# Patient Record
Sex: FEMALE | Race: BLACK OR AFRICAN AMERICAN | NOT HISPANIC OR LATINO | URBAN - METROPOLITAN AREA
[De-identification: names, ages, dates, MRNs, and addresses within clinical notes are randomized per-mention and may not be internally consistent; named-entity substitution may affect disease eponyms.]

---

## 2018-01-10 NOTE — PATIENT DISCUSSION
Glasses Rx given with edu that vision improvement with Rx change limited by cataracts. Pt should consider Cat sx for further VA improvement.

## 2018-01-10 NOTE — PATIENT DISCUSSION
The cataracts are visually significant. Pt edu that while there was some change to glasses Rx, her cataracts are causing the more significant vision issue, which will limit BCVA with new Rx lenses.

## 2018-02-27 NOTE — PATIENT DISCUSSION
"""Start Tobradex Drops left eye three times a day. "" ""Start Keflex 500 mg three times a day. x 7 days"" ""Start Warm compresses left eye frequently.  with Oasis Mask"""

## 2021-06-02 ENCOUNTER — IMPORTED ENCOUNTER (OUTPATIENT)
Dept: URBAN - METROPOLITAN AREA CLINIC 9 | Facility: CLINIC | Age: 56
End: 2021-06-02

## 2021-06-08 ENCOUNTER — IMPORTED ENCOUNTER (OUTPATIENT)
Dept: URBAN - METROPOLITAN AREA CLINIC 9 | Facility: CLINIC | Age: 56
End: 2021-06-08

## 2021-06-10 ENCOUNTER — IMPORTED ENCOUNTER (OUTPATIENT)
Dept: URBAN - METROPOLITAN AREA CLINIC 9 | Facility: CLINIC | Age: 56
End: 2021-06-10

## 2021-06-18 ENCOUNTER — IMPORTED ENCOUNTER (OUTPATIENT)
Dept: URBAN - METROPOLITAN AREA CLINIC 9 | Facility: CLINIC | Age: 56
End: 2021-06-18

## 2021-07-27 ENCOUNTER — IMPORTED ENCOUNTER (OUTPATIENT)
Dept: URBAN - METROPOLITAN AREA CLINIC 9 | Facility: CLINIC | Age: 56
End: 2021-07-27

## 2021-10-16 ASSESSMENT — KERATOMETRY
OS_K1POWER_DIOPTERS: 44.75
OD_K2POWER_DIOPTERS: 45.75
OS_AXISANGLE2_DEGREES: 164
OS_K2POWER_DIOPTERS: 45.25
OD_AXISANGLE2_DEGREES: 46
OD_AXISANGLE_DEGREES: 136
OD_K1POWER_DIOPTERS: 45.25
OS_AXISANGLE_DEGREES: 74

## 2021-10-16 ASSESSMENT — VISUAL ACUITY
OS_CC: 20/40 SN
OS_SC: 20/30 -2 SN
OS_SC: 20/50 SN
OS_SC: 20/60 SN
OD_SC: 20/400 SN
OD_SC: CF 2FT SN
OS_SC: 20/40 - SN
OD_SC: 20/100 SN
OD_SC: CF 2FT SN
OD_PH: 20/200 SN
OD_CC: 20/200 SN
OS_CC: 20/40 SN
OD_SC: 20/400 SN
OD_CC: 20/200 SN
OS_SC: 20/60 SN

## 2021-10-16 ASSESSMENT — TONOMETRY
OS_IOP_MMHG: 18
OS_IOP_MMHG: 14
OD_IOP_MMHG: 17
OS_IOP_MMHG: 14
OD_IOP_MMHG: 15
OS_IOP_MMHG: 10
OD_IOP_MMHG: 14
OS_IOP_MMHG: 12
OD_IOP_MMHG: 11

## 2023-04-25 ENCOUNTER — ESTABLISHED PATIENT (OUTPATIENT)
Dept: URBAN - METROPOLITAN AREA CLINIC 9 | Facility: CLINIC | Age: 58
End: 2023-04-25

## 2023-04-25 DIAGNOSIS — H25.12: ICD-10-CM

## 2023-04-25 DIAGNOSIS — H26.101: ICD-10-CM

## 2023-04-25 PROCEDURE — 99214 OFFICE O/P EST MOD 30 MIN: CPT

## 2023-04-25 PROCEDURE — 92136 OPHTHALMIC BIOMETRY: CPT

## 2023-04-25 ASSESSMENT — VISUAL ACUITY
OD_SC: CF 4FT
OS_SC: 20/60
OD_BCVA: 20/100
OS_BCVA: 20/50

## 2023-04-25 ASSESSMENT — KERATOMETRY
OD_AXISANGLE2_DEGREES: 18
OS_K1POWER_DIOPTERS: 45.25
OS_AXISANGLE_DEGREES: 28
OD_K1POWER_DIOPTERS: 45.25
OD_K2POWER_DIOPTERS: 45.75
OD_AXISANGLE_DEGREES: 108
OS_K2POWER_DIOPTERS: 45.75
OS_AXISANGLE2_DEGREES: 118

## 2023-04-25 ASSESSMENT — TONOMETRY
OD_IOP_MMHG: 16
OS_IOP_MMHG: 17

## 2023-05-25 ENCOUNTER — POST-OP (OUTPATIENT)
Dept: URBAN - METROPOLITAN AREA CLINIC 9 | Facility: CLINIC | Age: 58
End: 2023-05-25

## 2023-05-25 DIAGNOSIS — Z96.1: ICD-10-CM

## 2023-05-25 PROCEDURE — 99024 POSTOP FOLLOW-UP VISIT: CPT

## 2023-05-25 ASSESSMENT — KERATOMETRY
OD_K2POWER_DIOPTERS: 45.75
OS_K1POWER_DIOPTERS: 45.25
OD_K1POWER_DIOPTERS: 45.25
OD_AXISANGLE2_DEGREES: 18
OS_K2POWER_DIOPTERS: 45.75
OS_AXISANGLE_DEGREES: 28
OD_AXISANGLE_DEGREES: 108
OS_AXISANGLE2_DEGREES: 118

## 2023-05-25 ASSESSMENT — VISUAL ACUITY
OD_SC: 20/40
OS_SC: 20/50-1

## 2023-05-25 ASSESSMENT — TONOMETRY
OD_IOP_MMHG: 21
OS_IOP_MMHG: 16